# Patient Record
Sex: MALE | Race: WHITE | URBAN - NONMETROPOLITAN AREA
[De-identification: names, ages, dates, MRNs, and addresses within clinical notes are randomized per-mention and may not be internally consistent; named-entity substitution may affect disease eponyms.]

---

## 2020-12-22 ENCOUNTER — TELEPHONE (OUTPATIENT)
Dept: NEUROSURGERY | Age: 70
End: 2020-12-22

## 2020-12-28 ENCOUNTER — TELEPHONE (OUTPATIENT)
Dept: NEUROSURGERY | Age: 70
End: 2020-12-28

## 2020-12-28 NOTE — TELEPHONE ENCOUNTER
2nd attempt to call patient to schedule appointment , left voicemail with call back number [phone]

## 2020-12-29 ENCOUNTER — TELEPHONE (OUTPATIENT)
Dept: NEUROSURGERY | Age: 70
End: 2020-12-29

## 2021-01-22 ENCOUNTER — OFFICE VISIT (OUTPATIENT)
Dept: NEUROSURGERY | Age: 71
End: 2021-01-22
Payer: OTHER GOVERNMENT

## 2021-01-22 ENCOUNTER — TELEPHONE (OUTPATIENT)
Dept: NEUROSURGERY | Age: 71
End: 2021-01-22

## 2021-01-22 VITALS
WEIGHT: 211 LBS | DIASTOLIC BLOOD PRESSURE: 87 MMHG | HEART RATE: 79 BPM | HEIGHT: 70 IN | SYSTOLIC BLOOD PRESSURE: 143 MMHG | OXYGEN SATURATION: 98 % | BODY MASS INDEX: 30.21 KG/M2

## 2021-01-22 DIAGNOSIS — M51.36 DDD (DEGENERATIVE DISC DISEASE), LUMBAR: ICD-10-CM

## 2021-01-22 DIAGNOSIS — Z98.890 HISTORY OF LUMBAR SURGERY: ICD-10-CM

## 2021-01-22 DIAGNOSIS — R20.0 LEFT LEG NUMBNESS: ICD-10-CM

## 2021-01-22 DIAGNOSIS — R25.2 LEG CRAMP: ICD-10-CM

## 2021-01-22 DIAGNOSIS — M48.061 LUMBAR FORAMINAL STENOSIS: ICD-10-CM

## 2021-01-22 DIAGNOSIS — M48.061 SPINAL STENOSIS OF LUMBAR REGION WITHOUT NEUROGENIC CLAUDICATION: ICD-10-CM

## 2021-01-22 DIAGNOSIS — M53.3 SACROILIAC JOINT PAIN: Primary | ICD-10-CM

## 2021-01-22 PROCEDURE — 99204 OFFICE O/P NEW MOD 45 MIN: CPT | Performed by: NEUROLOGICAL SURGERY

## 2021-01-22 RX ORDER — IBUPROFEN 200 MG
200 TABLET ORAL EVERY 6 HOURS PRN
COMMUNITY

## 2021-01-22 RX ORDER — ACETAMINOPHEN 160 MG
2000 TABLET,DISINTEGRATING ORAL DAILY
COMMUNITY

## 2021-01-22 RX ORDER — LOSARTAN POTASSIUM 100 MG/1
100 TABLET ORAL DAILY
COMMUNITY

## 2021-01-22 RX ORDER — LEVOTHYROXINE SODIUM 0.1 MG/1
100 TABLET ORAL DAILY
COMMUNITY

## 2021-01-22 RX ORDER — SAW/VIT E/SOD SEL/LYC/BETA/PYG 160-100
1 TABLET ORAL DAILY
COMMUNITY

## 2021-01-22 RX ORDER — ASPIRIN 81 MG/1
81 TABLET ORAL DAILY
COMMUNITY

## 2021-01-22 ASSESSMENT — ENCOUNTER SYMPTOMS
GASTROINTESTINAL NEGATIVE: 1
BACK PAIN: 1
EYES NEGATIVE: 1
RESPIRATORY NEGATIVE: 1

## 2021-01-22 NOTE — TELEPHONE ENCOUNTER
Called [facility] in [facility] at [phone] and left a message with Mark Josue in medical records. I requested this patient surgery records from 2016 to be faxed to our office today with attention to Dr Rimma Agosto. Provided the office number and my extension in case she needs to speak with me directly.

## 2021-01-22 NOTE — PROGRESS NOTES
[facility]  Office Visit      Chief Complaint   Patient presents with    Referral - General    Back Pain    Numbness       HISTORY OF PRESENT ILLNESS:  Lion Mendiola is a 69 y.o. male with a history of previous lumbar surgery in 2016, who presents with right low back pain, he points to the right SI region. The pain does not radiate. His pain is mostly located in the right upper buttock area. The patient complains of numbness of the left hip, posterolateral thigh, lateral foot, and to the toes that is chronic. Prior to him participating in PT he had severe left leg cramping and tightening sensations. Of note, prior to his lumbar surgery in 2016 he complained of left leg pain, that improved with surgery. His pain is worsened when going from a seated to standing position. His pain is worsened with walking. His pain is improved when lying flat. Overall, indicative that the patient does have a mechanical nature to their pain. The patient has underwent a non-operative treatment course that has included:  NSAIDs (ibuprofen)  Physical Therapy (15 sessions - helped the left leg tightness)      Of note he does not use tobacco and does take blood thinning medications (ASA). Of note, he does have a pacemaker that is MRI compatible.                Past Medical History:   Diagnosis Date    Difficulty walking     Hypertension     Hypothyroidism     Low back pain     Sleep apnea        Past Surgical History:   Procedure Laterality Date    BACK SURGERY  2016    L2, L3 and L4    PACEMAKER PLACEMENT  2016       Current Outpatient Medications   Medication Sig Dispense Refill    aspirin 81 MG EC tablet Take 81 mg by mouth daily      ibuprofen (ADVIL;MOTRIN) 200 MG tablet Take 200 mg by mouth every 6 hours as needed      losartan (COZAAR) 100 MG tablet Take 100 mg by mouth daily      levothyroxine (SYNTHROID) 100 MCG tablet Take 100 mcg by mouth Daily      [facility] (COMPLETE [place]) TABS Take 1 tablet by mouth daily      Cholecalciferol (VITAMIN D3) 50 MCG (2000 UT) CAPS Take 2,000 Units by mouth daily       No current facility-administered medications for this visit. Allergies:  Banana and Other    Social History:   Social History     Tobacco Use   Smoking Status Never Smoker   Smokeless Tobacco Never Used     Social History     Substance and Sexual Activity   Alcohol Use Yes         Family History:   Family History   Family history unknown: Yes       REVIEW OF SYSTEMS:  Constitutional: Negative. HENT: Negative. Eyes: Negative. Respiratory: Negative. Cardiovascular: Negative. Gastrointestinal: Negative. Genitourinary: Negative. Musculoskeletal: Positive for back pain. Skin: Negative. Neurological: Positive for tingling and weakness. Endo/Heme/Allergies: Negative. Psychiatric/Behavioral: Negative. PHYSICAL EXAM:  Vitals:    01/22/21 1016   BP: (!) 143/87   Pulse: 79   SpO2: 98%     Constitutional: appears well-developed and well-nourished. Eyes - conjunctiva normal.  Pupils react to light  Ear, nose, throat - hearing intact to finger rub, No scars, masses, or lesions over external nose or ears, no atrophy oftongue  Neck- symmetric, no masses noted, no jugular vein distension  Respiration- chest wall appears symmetric, good expansion, normal effort without use of accessory muscles  Musculoskeletal - no significant wasting of muscles noted, no bony deformities, gait no gross ataxia  Extremities- no clubbing, cyanosis oredema  Skin - warm, dry, and intact. No rash, erythema, or pallor.   Psychiatric - mood, affect, and behavior appear normal.     Neurologic Examination  Awake, Alert and oriented x 4  Normal speech pattern, following commands    Motor:  RIGHT:     iliopsoas 5/5    knee flexor 5/5    knee extension 5/5    EHL/dorsiflexion 5/5    plantar flexion 5/5    LEFT:     iliopsoas 5/5    knee flexor 5/5    knee extension 5/5 EHL/dorsiflexion 5/5    plantar flexion 5/5    Decrease to pinprick sensation left lateral thigh and leg  Reflexes are 2+ and symmetric  No myofacial tenderness to palpation  Normal Gait pattern    Distraction + on RIGHT        DATA and IMAGING:    Nursing/pcp notes, imaging, labs, and vitals reviewed. PT,OT and/or speech notes reviewed    No results found for: WBC, HGB, HCT, MCV, PLTNo results found for: NA, K, CL, CO2, BUN, CREATININE, GLUCOSE, CALCIUM, PROT, LABALBU, BILITOT, ALKPHOS, AST, ALT, LABGLOM, GFRAA, AGRATIO, GLOBNo results found for: INR, PROTIME      CT Lumbar Spine [facility]  I have personally reviewed these images and my interpretation is: Of note, not all axial images were loaded correctly from the disc and were unable to be   There is DDD throughout  There does appear to be some stenosis but it is difficult to tell the severity      ASSESSMENT:    Elida Huntley is a 69 y.o. male with history of previous lumbar laminectomy in 2016 with complaints of right sided low back and possible SI joint pain. ICD-10-CM    1. Sacroiliac joint pain  M53.3 MRI LUMBAR SPINE W WO CONTRAST   2. DDD (degenerative disc disease), lumbar  M51.36 MRI LUMBAR SPINE W WO CONTRAST   3. Spinal stenosis of lumbar region without neurogenic claudication  M48.061 MRI LUMBAR SPINE W WO CONTRAST   4. Lumbar foraminal stenosis  M48.061 MRI LUMBAR SPINE W WO CONTRAST   5. Left leg numbness  R20.0 MRI LUMBAR SPINE W WO CONTRAST   6. Leg cramp  R25.2 MRI LUMBAR SPINE W WO CONTRAST   7. History of lumbar surgery  Z98.890 MRI LUMBAR SPINE W WO CONTRAST       PLAN:  We have discussed and reviewed the results of the CT lumbar spine with Mr. Fide Willard at length.   We explained that his back pain is likely not going to warrant surgical intervention but we would like to obtain a MRI to truly evaluate  -Obtain MRI lumbar w/wo  -Follow up after imaging      Scout Cade DO

## 2021-01-22 NOTE — PROGRESS NOTES
Review of Systems   Constitutional: Negative. HENT: Negative. Eyes: Negative. Respiratory: Negative. Cardiovascular: Negative. Gastrointestinal: Negative. Genitourinary: Negative. Musculoskeletal: Positive for back pain. Skin: Negative. Neurological: Positive for tingling and weakness. Endo/Heme/Allergies: Negative. Psychiatric/Behavioral: Negative.

## 2021-02-12 ENCOUNTER — TELEPHONE (OUTPATIENT)
Dept: NEUROSURGERY | Age: 71
End: 2021-02-12

## 2021-02-12 NOTE — TELEPHONE ENCOUNTER
Called and left VM asking patient to call the office and let us know if he got his MRI done and if so he will need to bring the CD with him to his appt, granted that the weather permits. Advised him to call the office and let us know either way once he received my message. Provided my extension number in case he would like to speak with me directly.

## 2021-02-15 ENCOUNTER — TELEPHONE (OUTPATIENT)
Dept: NEUROSURGERY | Age: 71
End: 2021-02-15

## 2021-02-15 NOTE — TELEPHONE ENCOUNTER
Called patient had to leave a vm asked patient to return my call or call the  office to r/s appointment due to the weather

## 2021-02-16 NOTE — TELEPHONE ENCOUNTER
Called patient again to r/s his appointment due to the weather. Had to leave another vm asking patient to return my call.

## 2022-08-12 ENCOUNTER — TELEPHONE (OUTPATIENT)
Dept: OTOLARYNGOLOGY | Facility: CLINIC | Age: 72
End: 2022-08-12

## 2022-08-12 NOTE — TELEPHONE ENCOUNTER
I have tried to contact patient several times and could not get a hold of him nor leave a message,do to voice mail being full for me to leave message and I do not have another number to call .

## 2022-09-21 ENCOUNTER — OFFICE VISIT (OUTPATIENT)
Dept: OTOLARYNGOLOGY | Facility: CLINIC | Age: 72
End: 2022-09-21

## 2022-09-21 VITALS — DIASTOLIC BLOOD PRESSURE: 78 MMHG | TEMPERATURE: 97.2 F | HEART RATE: 74 BPM | SYSTOLIC BLOOD PRESSURE: 153 MMHG

## 2022-09-21 DIAGNOSIS — Z95.0 PACEMAKER: ICD-10-CM

## 2022-09-21 DIAGNOSIS — Z79.02 ANTIPLATELET OR ANTITHROMBOTIC LONG-TERM USE: Primary | ICD-10-CM

## 2022-09-21 DIAGNOSIS — D17.0 LIPOMA OF FACE: ICD-10-CM

## 2022-09-21 DIAGNOSIS — R22.0 MASS OF FACE: ICD-10-CM

## 2022-09-21 PROCEDURE — 99203 OFFICE O/P NEW LOW 30 MIN: CPT | Performed by: OTOLARYNGOLOGY

## 2022-09-21 RX ORDER — LOSARTAN POTASSIUM 100 MG/1
100 TABLET ORAL
COMMUNITY

## 2022-09-21 RX ORDER — LEVOTHYROXINE SODIUM 0.1 MG/1
100 TABLET ORAL
COMMUNITY

## 2022-09-21 RX ORDER — IBUPROFEN 200 MG
200 TABLET ORAL
COMMUNITY

## 2022-09-21 RX ORDER — SAW/VIT E/SOD SEL/LYC/BETA/PYG 160-100
1 TABLET ORAL DAILY
COMMUNITY

## 2022-09-21 RX ORDER — ASPIRIN 81 MG/1
81 TABLET ORAL
COMMUNITY

## 2022-09-21 NOTE — PROGRESS NOTES
PRIMARY CARE PROVIDER: Rodri Kingsley MD  REFERRING PROVIDER: No ref. provider found    Chief Complaint   Patient presents with   • Lipoma     Lipoma to Right  Cheek         Subjective   History of Present Illness:  Clifford Horne is a  72 y.o. male who presents with complaints of a right preauricular fullness.  This has been present for a few years.  This is non tender, but has caused some overlying skin discoloration.  No imaging has been performed.    Review of Systems:  Review of Systems   Constitutional: Negative for chills, fatigue, fever and unexpected weight change.   HENT: Positive for hearing loss. Negative for facial swelling.    Respiratory: Negative for cough, chest tightness and shortness of breath.    Cardiovascular: Negative for chest pain.   Musculoskeletal: Negative for neck pain.   Skin: Negative for color change.   Neurological: Negative for facial asymmetry.   Hematological: Negative for adenopathy. Does not bruise/bleed easily.       Past History:  Past Medical History:   Diagnosis Date   • Hypertension    • Hypothyroidism      Past Surgical History:   Procedure Laterality Date   • FINGER SURGERY     • LUMBAR DISC SURGERY     • PACEMAKER IMPLANTATION     • PACEMAKER REPLACEMENT       Family History   Problem Relation Age of Onset   • Hypertension Mother    • Heart failure Mother    • Hypertension Father    • Thyroid disease Sister    • Diabetes Sister    • Cancer Sister      Social History     Tobacco Use   • Smoking status: Former Smoker   • Smokeless tobacco: Never Used   Substance Use Topics   • Alcohol use: Never   • Drug use: Never     Allergies:  Banana and Other    Current Outpatient Medications:   •  aspirin 81 MG EC tablet, Take 81 mg by mouth., Disp: , Rfl:   •  Cholecalciferol 50 MCG (2000 UT) capsule, Take 2,000 Units by mouth., Disp: , Rfl:   •  ibuprofen (ADVIL,MOTRIN) 200 MG tablet, Take 200 mg by mouth., Disp: , Rfl:   •  levothyroxine (SYNTHROID, LEVOTHROID) 100 MCG tablet,  Take 100 mcg by mouth., Disp: , Rfl:   •  losartan (COZAAR) 100 MG tablet, Take 100 mg by mouth., Disp: , Rfl:   •  Misc Natural Products (Complete Prostate Health) tablet, Take 1 tablet by mouth Daily., Disp: , Rfl:       Objective     Vital Signs:  Temp:  [97.2 °F (36.2 °C)] 97.2 °F (36.2 °C)  Heart Rate:  [74] 74  BP: (153)/(78) 153/78    Physical Exam:  Physical Exam  Vitals and nursing note reviewed.   Constitutional:       General: He is not in acute distress.     Appearance: He is well-developed. He is not diaphoretic.   HENT:      Head: Normocephalic and atraumatic.        Right Ear: External ear normal.      Left Ear: External ear normal.      Nose: Nose normal.   Eyes:      General: No scleral icterus.        Right eye: No discharge.         Left eye: No discharge.      Conjunctiva/sclera: Conjunctivae normal.      Pupils: Pupils are equal, round, and reactive to light.   Neck:      Thyroid: No thyromegaly.      Vascular: No JVD.      Trachea: No tracheal deviation.   Pulmonary:      Effort: Pulmonary effort is normal.      Breath sounds: No stridor.   Musculoskeletal:         General: No deformity. Normal range of motion.      Cervical back: Normal range of motion and neck supple.   Lymphadenopathy:      Cervical: No cervical adenopathy.   Skin:     General: Skin is warm and dry.      Coloration: Skin is not pale.      Findings: No erythema or rash.   Neurological:      Mental Status: He is alert and oriented to person, place, and time.      Cranial Nerves: No cranial nerve deficit.      Coordination: Coordination normal.   Psychiatric:         Speech: Speech normal.         Behavior: Behavior normal. Behavior is cooperative.         Thought Content: Thought content normal.         Judgment: Judgment normal.         Results Review:   Went ahead and ordered an ultrasound today which I have discussed with the sonographer.  Anterior to the parotid gland there is a 3.1 x 0.9 x 2.4 cm lesion consistent with a  lipoma.  This does not appear to be within the gland itself.    Assessment   Assessment:  1. Antiplatelet or antithrombotic long-term use    2. Mass of face    3. Lipoma of face    4. Pacemaker        Plan   Plan:  I discussed that this is likely a lipoma of the face.  That is a benign fatty tumor that tends to grow.  There is a very low risk of this turning into a malignancy.  Due to the growth, he would like this removed.  We discussed a preauricular incision, elevated the skin flap, removing the tumor, sending this for permanent section pathology, and closing the area.  We would see him back in a week to remove the sutures and to go over the pathology.    Discussed risks, benefits, alternatives, and possible complications of excision of the lesion with reconstruction utilizing local tissue rearrangement, full-thickness skin grafting, or local interpolated flaps. Risks include, but are not limited too: bleeding, infection, hematoma, recurrence, need for additional procedures, flap failure, cosmetic deformity. Patient understands risks and would like to proceed with surgery.  Alternatives include doing nothing.    My findings and recommendations were discussed and questions were answered.     Devon Gomez MD  09/21/22  11:48 CDT

## 2022-10-26 ENCOUNTER — PROCEDURE VISIT (OUTPATIENT)
Dept: OTOLARYNGOLOGY | Facility: CLINIC | Age: 72
End: 2022-10-26

## 2022-10-26 VITALS
DIASTOLIC BLOOD PRESSURE: 78 MMHG | HEART RATE: 65 BPM | WEIGHT: 205 LBS | TEMPERATURE: 98 F | SYSTOLIC BLOOD PRESSURE: 136 MMHG | RESPIRATION RATE: 20 BRPM | HEIGHT: 70 IN | BODY MASS INDEX: 29.35 KG/M2

## 2022-10-26 DIAGNOSIS — D17.0 LIPOMA OF FACE: Primary | ICD-10-CM

## 2022-10-26 PROCEDURE — 21012 EXC FACE LES SBQ 2 CM/>: CPT | Performed by: OTOLARYNGOLOGY

## 2022-10-26 PROCEDURE — 88304 TISSUE EXAM BY PATHOLOGIST: CPT | Performed by: OTOLARYNGOLOGY

## 2022-10-26 NOTE — PROGRESS NOTES
PATIENT NAME:  Clifford Horne    DATE: 10/26/22    PREOPERATIVE DIAGNOSIS: Lipoma of right cheek    POSTOPERATIVE DIAGNOSIS: Lipoma of right cheek    PROCEDURE: Excision of lipoma right cheek, 3.0 cm x 2.0 cm, subcutaneous    SURGEON:  Devon Gomez MD, FACS    FACILITY: [facility] Office Procedure Room    ANESTHESIA:  Local with 5 cc 1% lidocaine and 1:100,000 epinephrine    DICTATED BY:  Devon Gomez MD, FACS    IVF: None    IMPLANTS: None    DRAINS: None    SPECIMENS: Lipoma of right cheek    EBL: 10 cc    COMPLICATIONS: None apparent    INDICATIONS FOR SURGERY: Mr. Horne presented with a fullness in his right preauricular cheek consistent with a lipoma.  A preoperative ultrasound was performed demonstrating the lesion was superficial to the parotid gland.    OPERATIVE FINDINGS: There is a subcutaneous fat tumor measuring 3 cm x 2.7 cm.  This was directly on top of the parotid fascia.    OPERATIVE DETAILS: After patient verification consent was reviewed, the skin in the right preauricular area was cleansed with alcohol.  I then used a marking pen to delineate the borders of the lesion, and designed a preauricular incision.  I infiltrated the skin with 5 cc of 1% lidocaine with 1-100,000 epinephrine.  After approximately 15 minutes, the skin was tested for anesthesia and deemed appropriate.  The patient was then sterilely prepped and draped.    A 15 blade used to make a linear incision in the preauricular sulcus.  This anterior skin was retracted utilizing skin hooks and dissection was carried in the subcutaneous plane anteriorly utilizing curved iris scissors.  The parotid cutaneous ligaments were released.  Palpable fullness was below the subcutaneous fat and dissection was then carried down to this fullness.  There is no well demarcated Stool, however the fatty tumor demonstrated larger lobulations of fat which were a more firm consistency.  This was teased from the underlying parotid  fascia.  The lipoma was removed and sent for permanent section pathology.  Hemostasis was obtained with bipolar cautery set at 15.  I then closed the defect utilizing buried 5-0 undyed Vicryl suture followed by running, locking 5-0 Prolene.  Facial movement was fully intact at the conclusion of the procedure.      DISPOSITION:  The procedures were completed without complication and tolerated well.  The patient was released in the company of his wife to return home in satisfactory condition.  A follow-up appointment has been scheduled, routine post-op medications prescribed (if required), and post-op instructions were given to the responsible party.           Devon Gomez MD, FACS  Board Certified Facial Plastic and Reconstructive Surgery  Board Certified Otolaryngology -- Head and Neck       Electronically signed by Devon Gomez MD, 10/26/22, 3:05 PM CDT.

## 2022-10-26 NOTE — PATIENT INSTRUCTIONS
[facility], Post-Procedure Instructions:    Protect the incisions from sunlight. Sunlight to the incisions will cause permanent pigmentation to the incision line and make the incision more noticeable. After the incision has reepithelialized (typically 2-3 weeks after the procedure), you may begin to use sunscreen with an SPF of 15 or greater    For the first week after your procedure, apply a thin coat of Vaseline to the incision 3-4 times daily.  Starting the second week, use a silicone-based wound cream to the incisions to optimize the end result. Apply topically twice daily, or as directed, to help optimize wound healing and decrease redness.  Should the area need to be cleaned, gently apply peroxide on a Q-tip to the area.  Do not clean the area more than once daily with peroxide, as it can delay wound healing.    Avoid getting water on the surgical site for 3 days.  On day 4, you may briefly get the surgical site with clean water, but avoid soapy water to the area for 1 week.      Due to COVID-19, we have decreased the amount of postoperative checks to help prevent added exposure to the virus.  If you have any questions or concerns following her procedure, please give us a call.  We have the ability to schedule a video visit, phone visit, or follow-up visit to address any concerns, while decreasing your exposure to the hospital.  Many of your questions and concerns may be able to be answered over the phone.  Our direct line is [phone].    It is very important to continue routine skin checks with a dermatologist or your PCP every 6-12 months.        CONTACT INFORMATION:  The main office phone number is [phone]. For emergencies after hours and on weekends, this number will convert over to our answering service and the on call provider will answer. Please try to keep non emergent phone calls/ questions to office hours 9am-5pm Monday through Friday.     [employer]  As an alternative, you can  sign up and use the Epic MyChart system for more direct and quicker access for non emergent questions/ problems.  [facility] [employer] allows you to send messages to your doctor, view your test results, renew your prescriptions, schedule appointments, and more. To sign up, go to [email or web address] and click on the Sign Up Now link in the New User? box. Enter your [employer] Activation Code exactly as it appears below along with the last four digits of your Social Security Number and your Date of Birth () to complete the sign-up process. If you do not sign up before the expiration date, you must request a new code.    [employer] Activation Code: [email or web address]  Expires: 2022 12:51 PM    If you have questions, you can email [email or web address] or call [phone] to talk to our [employer] staff. Remember, [employer] is NOT to be used for urgent needs. For medical emergencies, dial 911.

## 2022-10-28 LAB
CYTO UR: NORMAL
LAB AP CASE REPORT: NORMAL
LAB AP CLINICAL INFORMATION: NORMAL
Lab: NORMAL
PATH REPORT.FINAL DX SPEC: NORMAL
PATH REPORT.GROSS SPEC: NORMAL

## 2023-02-06 ENCOUNTER — OFFICE VISIT (OUTPATIENT)
Dept: OTOLARYNGOLOGY | Facility: CLINIC | Age: 73
End: 2023-02-06
Payer: OTHER GOVERNMENT

## 2023-02-06 VITALS
SYSTOLIC BLOOD PRESSURE: 116 MMHG | WEIGHT: 205 LBS | DIASTOLIC BLOOD PRESSURE: 74 MMHG | RESPIRATION RATE: 20 BRPM | TEMPERATURE: 98 F | HEART RATE: 65 BPM | HEIGHT: 76 IN | BODY MASS INDEX: 24.96 KG/M2

## 2023-02-06 DIAGNOSIS — D17.0 LIPOMA OF FACE: Primary | ICD-10-CM

## 2023-02-06 DIAGNOSIS — Z95.0 PACEMAKER: ICD-10-CM

## 2023-02-06 DIAGNOSIS — Z79.02 ANTIPLATELET OR ANTITHROMBOTIC LONG-TERM USE: ICD-10-CM

## 2023-02-06 PROCEDURE — 99212 OFFICE O/P EST SF 10 MIN: CPT | Performed by: OTOLARYNGOLOGY

## 2023-02-06 NOTE — PROGRESS NOTES
PRIMARY CARE PROVIDER: Rodri Kingsley MD  REFERRING PROVIDER: Rodri Kingsley MD    Chief Complaint   Patient presents with   • Follow-up     Exc lipoma of right cheek        Subjective   History of Present Illness:  Clifford Horne is a  72 y.o. male who returns to the office for evaluation.  On October 26, 2022, I removed a lipoma of the right cheek. He is pleased with the result and denies any recurrence.    Review of Systems:  Review of Systems   Constitutional: Negative for chills, fatigue, fever and unexpected weight change.   HENT: Negative for facial swelling.    Respiratory: Negative for cough, chest tightness and shortness of breath.    Cardiovascular: Negative for chest pain.   Musculoskeletal: Negative for neck pain.   Skin: Negative for color change.   Neurological: Negative for facial asymmetry.   Hematological: Negative for adenopathy. Does not bruise/bleed easily.       Past History:  Past Medical History:   Diagnosis Date   • Hypertension    • Hypothyroidism      Past Surgical History:   Procedure Laterality Date   • FINGER SURGERY     • LUMBAR DISC SURGERY     • PACEMAKER IMPLANTATION     • PACEMAKER REPLACEMENT       Family History   Problem Relation Age of Onset   • Hypertension Mother    • Heart failure Mother    • Hypertension Father    • Thyroid disease Sister    • Diabetes Sister    • Cancer Sister      Social History     Tobacco Use   • Smoking status: Former   • Smokeless tobacco: Never   Substance Use Topics   • Alcohol use: Never   • Drug use: Never     Allergies:  Banana and Other    Current Outpatient Medications:   •  aspirin 81 MG EC tablet, Take 81 mg by mouth., Disp: , Rfl:   •  Cholecalciferol 50 MCG (2000 UT) capsule, Take 2,000 Units by mouth., Disp: , Rfl:   •  ibuprofen (ADVIL,MOTRIN) 200 MG tablet, Take 200 mg by mouth., Disp: , Rfl:   •  levothyroxine (SYNTHROID, LEVOTHROID) 100 MCG tablet, Take 100 mcg by mouth., Disp: , Rfl:   •  losartan (COZAAR) 100 MG tablet, Take 100  mg by mouth., Disp: , Rfl:   •  Misc Natural Products (Complete Prostate Health) tablet, Take 1 tablet by mouth Daily., Disp: , Rfl:       Objective     Vital Signs:  Temp:  [98 °F (36.7 °C)] 98 °F (36.7 °C)  Heart Rate:  [65] 65  Resp:  [20] 20  BP: (116)/(74) 116/74    Physical Exam:  Physical Exam  Vitals and nursing note reviewed.   Constitutional:       General: He is not in acute distress.     Appearance: He is well-developed. He is not diaphoretic.   HENT:      Head: Normocephalic and atraumatic.        Right Ear: External ear normal.      Left Ear: External ear normal.      Nose: Nose normal.   Eyes:      General: No scleral icterus.        Right eye: No discharge.         Left eye: No discharge.      Conjunctiva/sclera: Conjunctivae normal.      Pupils: Pupils are equal, round, and reactive to light.   Neck:      Thyroid: No thyromegaly.      Vascular: No JVD.      Trachea: No tracheal deviation.   Pulmonary:      Effort: Pulmonary effort is normal.      Breath sounds: No stridor.   Musculoskeletal:         General: No deformity. Normal range of motion.      Cervical back: Normal range of motion and neck supple.   Lymphadenopathy:      Cervical: No cervical adenopathy.   Skin:     General: Skin is warm and dry.      Coloration: Skin is not pale.      Findings: No erythema or rash.   Neurological:      Mental Status: He is alert and oriented to person, place, and time.      Cranial Nerves: No cranial nerve deficit.      Coordination: Coordination normal.   Psychiatric:         Speech: Speech normal.         Behavior: Behavior normal. Behavior is cooperative.         Thought Content: Thought content normal.         Judgment: Judgment normal.         Results Review:         Assessment   Assessment:  1. Lipoma of face    2. Antiplatelet or antithrombotic long-term use    3. Pacemaker        Plan   Plan:  [facility], Post-Procedure Instructions:    Protect the incisions from sunlight. Sunlight to the  incisions will cause permanent pigmentation to the incision line and make the incision more noticeable. After the incision has reepithelialized (typically 2-3 weeks after the procedure), you may begin to use sunscreen with an SPF of 15 or greater    F/U PRN.     My findings and recommendations were discussed and questions were answered.     Devon Gomez MD  02/06/23  14:34 CST